# Patient Record
Sex: MALE | Race: WHITE | NOT HISPANIC OR LATINO | Employment: UNEMPLOYED | URBAN - NONMETROPOLITAN AREA
[De-identification: names, ages, dates, MRNs, and addresses within clinical notes are randomized per-mention and may not be internally consistent; named-entity substitution may affect disease eponyms.]

---

## 2017-01-01 ENCOUNTER — APPOINTMENT (OUTPATIENT)
Dept: MRI IMAGING | Facility: HOSPITAL | Age: 47
End: 2017-01-01

## 2017-01-01 ENCOUNTER — TRANSCRIBE ORDERS (OUTPATIENT)
Dept: ADMINISTRATIVE | Facility: HOSPITAL | Age: 47
End: 2017-01-01

## 2017-01-01 ENCOUNTER — HOSPITAL ENCOUNTER (OUTPATIENT)
Dept: MRI IMAGING | Facility: HOSPITAL | Age: 47
Discharge: HOME | End: 2017-02-24

## 2017-01-01 ENCOUNTER — HOSPITAL ENCOUNTER (EMERGENCY)
Facility: HOSPITAL | Age: 47
End: 2017-03-05
Attending: EMERGENCY MEDICINE | Admitting: EMERGENCY MEDICINE

## 2017-01-01 VITALS — BODY MASS INDEX: 32.51 KG/M2 | TEMPERATURE: 97.2 F | HEIGHT: 72 IN | WEIGHT: 240 LBS

## 2017-01-01 DIAGNOSIS — M54.12 CERVICAL RADICULITIS: ICD-10-CM

## 2017-01-01 DIAGNOSIS — I46.9 CARDIAC ARREST (HCC): Primary | ICD-10-CM

## 2017-01-01 DIAGNOSIS — M54.12 CERVICAL RADICULITIS: Primary | ICD-10-CM

## 2017-01-01 DIAGNOSIS — I46.9 ASYSTOLE (HCC): ICD-10-CM

## 2017-01-01 PROCEDURE — 99284 EMERGENCY DEPT VISIT MOD MDM: CPT

## 2017-03-05 NOTE — ED NOTES
Dr. Hargrove to [facility] to talk to pt family at this time.      Jill Sun, RN  03/04/17 [id number]

## 2017-03-05 NOTE — ED NOTES
Pt presents to er via ems in full arrest via ems. CPR is in progress via ems. Pt is being bagged manually via ems.  DR. Hargrove at bedside. Upon initial rythym check pt is asystole with no detectable pulse.  Pt is dusky, cyanotic and cool.  Pupils fixed, non responsive.  Pt DOA per Dr. Hargrove.  No detectable signs of life.      Jill Sun, OLIVER  03/04/17 1459

## 2017-03-05 NOTE — ED NOTES
Pt family assisted to [facility] by Gabriella Langley RN.       Jill Sun RN  03/04/17 7886

## 2017-03-05 NOTE — ED PROVIDER NOTES
Subjective   HPI Comments: Pt brought from home in full arrest.  Prolonged transport; greater than 1 hour since call.  Pt arrived cyanotic, pulsess, no spontaneous respiration and asystole on cardiac monitor.  Pronounced DOA at h.    Patient is a 46 y.o. male presenting with cardiac arrest.   History provided by:  EMS personnel  History limited by: /DOA.  Cardiac Arrest       Review of Systems   Unable to perform ROS: Other (/DOA)       Past Medical History   Diagnosis Date   • Asthma        Allergies   Allergen Reactions   • Toradol [Ketorolac Tromethamine] Shortness Of Breath and Swelling       Past Surgical History   Procedure Laterality Date   • Ankle surgery     • Gallbladder surgery         Family History   Problem Relation Age of Onset   • Diabetes Mother    • Congenital heart disease Father    • Emphysema Father        Social History     Social History   • Marital status:      Spouse name: N/A   • Number of children: N/A   • Years of education: N/A     Social History Main Topics   • Smoking status: Heavy Tobacco Smoker     Packs/day: 2.00     Types: Cigarettes   • Smokeless tobacco: Never Used      Comment: states some days will smoke 3 packs/day due to pain   • Alcohol use No   • Drug use: Yes     Special: Marijuana      Comment: smoked this morning due to pain   • Sexual activity: Defer     Other Topics Concern   • Not on file     Social History Narrative           Objective   Physical Exam   Constitutional: He appears well-developed and well-nourished.   Cyanotic, pulseless, apneic  Asystole on monitor   HENT:   Head: Normocephalic and atraumatic.   Eyes: Right eye exhibits no discharge. Left eye exhibits no discharge.   Neck: No tracheal deviation present.   Cardiovascular:   Pulseless, Asystole   Pulmonary/Chest:   Apneic   Abdominal: He exhibits no distension.   Musculoskeletal: He exhibits no deformity.   Neurological:   Unresponsive, flaccid   Skin:   Cyanotic        Procedures         ED Course  ED Course   Comment By Time   Arrived greater than 1 hour post-EMS call.  Pt remains pulseless, Asystole on monitor.  Pronounced DOA at 2226h. Min Hargrove MD 03/04 2228                  [facility]    Final diagnoses:   Cardiac arrest   Asystole            Min Hargrove MD  03/04/17 7494

## 2017-03-05 NOTE — ED NOTES
Pt body leaving with Justin Nicholas () Pt has stefanie airway in place upon leaving er.      Jill Sun RN  03/05/17 0051

## 2017-03-05 NOTE — ED NOTES
Spoke with Justin Nicholas (). States he is on his way to facility.      Jill Sun RN  03/04/17 7454

## 2017-03-05 NOTE — ED NOTES
[facility] notified. Spoke with Selin Molina. Case ID number [id number].  Pt is not ruled out for organ donation.  Selin states she will call back in approximately 45 minutes.      Jill Sun RN  03/04/17 7353       Jill Sun RN  03/04/17 0156

## 2017-03-05 NOTE — ED NOTES
CPR in progress via EMS at arrival. Dr. Hargrove at bedside upon arrival. Asystole on the monitor, no palpable pulses, no spontaneous respirations, no visible signs of life at this time. Dr. Hargrove called [facility].      Julia Wasserman RN  03/04/17 9486

## 2017-03-05 NOTE — ED NOTES
Brandan Colon called back from [facility] with case id [id number]. Pt was r/o for donateion r/t long term senior care stay.      Jill Sun RN  03/05/17 0052